# Patient Record
Sex: FEMALE
[De-identification: names, ages, dates, MRNs, and addresses within clinical notes are randomized per-mention and may not be internally consistent; named-entity substitution may affect disease eponyms.]

---

## 2019-09-19 ENCOUNTER — HOSPITAL ENCOUNTER (OUTPATIENT)
Dept: HOSPITAL 88 - CT | Age: 49
End: 2019-09-19
Attending: UROLOGY
Payer: COMMERCIAL

## 2019-09-19 DIAGNOSIS — R31.21: Primary | ICD-10-CM

## 2019-09-19 DIAGNOSIS — N28.1: ICD-10-CM

## 2019-09-19 PROCEDURE — 81025 URINE PREGNANCY TEST: CPT

## 2019-09-19 PROCEDURE — 74178 CT ABD&PLV WO CNTR FLWD CNTR: CPT

## 2019-09-19 NOTE — DIAGNOSTIC IMAGING REPORT
CT of the abdomen and pelvis, with contrast, 9/19/2019.    





History: Hematuria.



Comparison: None available.



Technique: Multidetector CT scanning of the abdomen and pelvis was performed

from the level of the lung bases to the inferior pubic rami after intravenous

administration of contrast.  Coronal and sagittal multiplanar reformations were

obtained.





RADIATION DOSE:

     Total DLP: 771 mGy*cm

     Dose modulation, iterative reconstruction, and/or weight based adjustment

of the mA/kV was utilized to reduce the radiation dose to as low as reasonably

achievable. 



Discussion: 

LUNG BASES: No visualized abnormalities.



ABDOMEN: A 6 mm hypodensity is present in the upper pole of the left kidney.

The kidneys are otherwise unremarkable. There is no evidence of hydronephrosis

or nephrolithiasis. The ureters are partially visualized throughout their

course to the bladder without evidence of irregularity or filling defect.  The

mid portions of both ureters are not opacified with contrast. 



The liver, gallbladder, biliary tree, spleen, pancreas, and adrenal glands are

normal.  The abdominal aorta is within normal limits for size. Evaluation of

bowel is limited without oral contrast. There is no bowel dilatation. The

appendix is visualized and is normal.  There is no evidence of adenopathy or

free fluid.   



PELVIS: The bladder, uterus, adnexa are normal in appearance. There is a trace

amount of free fluid, likely physiologic.



BONES AND SOFT TISSUES: No abnormality.





IMPRESSION:

6 mm left renal hypodensity which is too small to characterize. Otherwise

unremarkable urinary system.





Signed by: Milan Cortes on 9/19/2019 2:53 PM